# Patient Record
Sex: FEMALE | Race: WHITE | NOT HISPANIC OR LATINO | Employment: FULL TIME | URBAN - METROPOLITAN AREA
[De-identification: names, ages, dates, MRNs, and addresses within clinical notes are randomized per-mention and may not be internally consistent; named-entity substitution may affect disease eponyms.]

---

## 2017-04-27 ENCOUNTER — OFFICE VISIT (OUTPATIENT)
Dept: FAMILY MEDICINE CLINIC | Facility: CLINIC | Age: 57
End: 2017-04-27

## 2017-04-27 VITALS
DIASTOLIC BLOOD PRESSURE: 70 MMHG | HEART RATE: 74 BPM | TEMPERATURE: 98.2 F | BODY MASS INDEX: 32.17 KG/M2 | RESPIRATION RATE: 16 BRPM | WEIGHT: 174.8 LBS | OXYGEN SATURATION: 98 % | SYSTOLIC BLOOD PRESSURE: 104 MMHG | HEIGHT: 62 IN

## 2017-04-27 DIAGNOSIS — Z00.00 HEALTH CARE MAINTENANCE: Primary | ICD-10-CM

## 2017-04-27 DIAGNOSIS — R53.82 CHRONIC FATIGUE: ICD-10-CM

## 2017-04-27 DIAGNOSIS — J30.1 SEASONAL ALLERGIC RHINITIS DUE TO POLLEN: ICD-10-CM

## 2017-04-27 PROCEDURE — 99386 PREV VISIT NEW AGE 40-64: CPT | Performed by: INTERNAL MEDICINE

## 2017-04-27 RX ORDER — OLOPATADINE HYDROCHLORIDE 1 MG/ML
1 SOLUTION/ DROPS OPHTHALMIC 2 TIMES DAILY
Qty: 5 ML | Refills: 1 | Status: SHIPPED | OUTPATIENT
Start: 2017-04-27 | End: 2019-06-10

## 2017-04-27 RX ORDER — MONTELUKAST SODIUM 10 MG/1
TABLET ORAL
Refills: 3 | COMMUNITY
Start: 2017-04-03

## 2017-04-27 NOTE — PROGRESS NOTES
"Subjective   Patient ID: Luz Mcmahon is a 57 y.o. female presents with   Chief Complaint   Patient presents with   • Annual Exam     Former [facility]  patient   • Establish Care       HPI - This patient presents today to establish care.  She has a history of seasonal allergic rhinitis.  She is on Singulair and antihistamines and fluticasone.  But she also has allergic conjunctivitis and her eyes stay very itchy and red.  Overall she feels good and she is caught up on her mammogram and colonoscopy.    Assessment plan    Seasonal allergic rhinitis due to pollen and allergic conjunctivitis - patenol eyedrops    Mild occasional fatigue and health care maintenance CBC CMP lipids and thyroids caught up on screenings.    Allergies   Allergen Reactions   • Sulfa Antibiotics        The following portions of the patient's history were reviewed and updated as appropriate: allergies, current medications, past family history, past medical history, past social history, past surgical history and problem list.      Review of Systems   Constitutional: Positive for fatigue.   HENT: Negative.    Eyes: Positive for itching.   Respiratory: Negative.    Cardiovascular: Negative.    Gastrointestinal: Negative.    Endocrine: Negative.    Genitourinary: Negative.    Musculoskeletal: Negative.    Skin: Negative.    Allergic/Immunologic: Negative.    Neurological: Negative.    Hematological: Negative.    Psychiatric/Behavioral: Negative.        Objective     Vitals:    04/27/17 1101   BP: 104/70   Pulse: 74   Resp: 16   Temp: 98.2 °F (36.8 °C)   TempSrc: Oral   SpO2: 98%   Weight: 174 lb 12.8 oz (79.3 kg)   Height: 61.5\" (156.2 cm)         Physical Exam   Constitutional: She is oriented to person, place, and time. She appears well-developed and well-nourished. No distress.   HENT:   Head: Normocephalic and atraumatic.   Eyes: EOM are normal. Pupils are equal, round, and reactive to light. Right eye exhibits no discharge. Left eye " exhibits no discharge. No scleral icterus.   Conjunctival injection   Neck: Normal range of motion. Neck supple. No tracheal deviation present. No thyromegaly present.   Cardiovascular: Normal rate, regular rhythm, normal heart sounds and normal pulses.  Exam reveals no gallop.    No murmur heard.  Pulmonary/Chest: Effort normal and breath sounds normal. No respiratory distress. She has no wheezes. She has no rales.   Abdominal: Soft. Bowel sounds are normal. There is no tenderness.   Musculoskeletal: Normal range of motion.   Neurological: She is alert and oriented to person, place, and time.   Skin: Skin is warm and dry. No rash noted. No erythema. No pallor.   Psychiatric: She has a normal mood and affect. Her behavior is normal. Judgment and thought content normal.   Nursing note and vitals reviewed.        Luz was seen today for annual exam and establish care.    Diagnoses and all orders for this visit:    Health care maintenance  -     Lipid Panel  -     CBC & Differential  -     Comprehensive Metabolic Panel  -     TSH+Free T4    Seasonal allergic rhinitis due to pollen  -     Lipid Panel  -     CBC & Differential  -     Comprehensive Metabolic Panel  -     TSH+Free T4    Chronic fatigue  -     Lipid Panel  -     CBC & Differential  -     Comprehensive Metabolic Panel  -     TSH+Free T4    Other orders  -     olopatadine (PATANOL) 0.1 % ophthalmic solution; Administer 1 drop to both eyes 2 (Two) Times a Day.        Call or return to clinic prn if these symptoms worsen or fail to improve as anticipated.

## 2017-05-02 LAB
ALBUMIN SERPL-MCNC: 4.4 G/DL (ref 3.5–5.2)
ALBUMIN/GLOB SERPL: 1.6 G/DL
ALP SERPL-CCNC: 108 U/L (ref 39–117)
ALT SERPL-CCNC: 24 U/L (ref 1–33)
AST SERPL-CCNC: 20 U/L (ref 1–32)
BASOPHILS # BLD AUTO: 0.02 10*3/MM3 (ref 0–0.2)
BASOPHILS NFR BLD AUTO: 0.4 % (ref 0–1.5)
BILIRUB SERPL-MCNC: 0.4 MG/DL (ref 0.1–1.2)
BUN SERPL-MCNC: 18 MG/DL (ref 6–20)
BUN/CREAT SERPL: 25.4 (ref 7–25)
CALCIUM SERPL-MCNC: 9.7 MG/DL (ref 8.6–10.5)
CHLORIDE SERPL-SCNC: 102 MMOL/L (ref 98–107)
CHOLEST SERPL-MCNC: 259 MG/DL (ref 0–200)
CO2 SERPL-SCNC: 27 MMOL/L (ref 22–29)
CREAT SERPL-MCNC: 0.71 MG/DL (ref 0.57–1)
EOSINOPHIL # BLD AUTO: 0.1 10*3/MM3 (ref 0–0.7)
EOSINOPHIL NFR BLD AUTO: 1.9 % (ref 0.3–6.2)
ERYTHROCYTE [DISTWIDTH] IN BLOOD BY AUTOMATED COUNT: 13.5 % (ref 11.7–13)
GLOBULIN SER CALC-MCNC: 2.8 GM/DL
GLUCOSE SERPL-MCNC: 86 MG/DL (ref 65–99)
HCT VFR BLD AUTO: 41.2 % (ref 35.6–45.5)
HDLC SERPL-MCNC: 83 MG/DL (ref 40–60)
HGB BLD-MCNC: 13.5 G/DL (ref 11.9–15.5)
IMM GRANULOCYTES # BLD: 0 10*3/MM3 (ref 0–0.03)
IMM GRANULOCYTES NFR BLD: 0 % (ref 0–0.5)
LDLC SERPL CALC-MCNC: 155 MG/DL (ref 0–100)
LYMPHOCYTES # BLD AUTO: 1.84 10*3/MM3 (ref 0.9–4.8)
LYMPHOCYTES NFR BLD AUTO: 34.3 % (ref 19.6–45.3)
MCH RBC QN AUTO: 30.8 PG (ref 26.9–32)
MCHC RBC AUTO-ENTMCNC: 32.8 G/DL (ref 32.4–36.3)
MCV RBC AUTO: 94.1 FL (ref 80.5–98.2)
MONOCYTES # BLD AUTO: 0.52 10*3/MM3 (ref 0.2–1.2)
MONOCYTES NFR BLD AUTO: 9.7 % (ref 5–12)
NEUTROPHILS # BLD AUTO: 2.89 10*3/MM3 (ref 1.9–8.1)
NEUTROPHILS NFR BLD AUTO: 53.7 % (ref 42.7–76)
PLATELET # BLD AUTO: 257 10*3/MM3 (ref 140–500)
POTASSIUM SERPL-SCNC: 4.3 MMOL/L (ref 3.5–5.2)
PROT SERPL-MCNC: 7.2 G/DL (ref 6–8.5)
RBC # BLD AUTO: 4.38 10*6/MM3 (ref 3.9–5.2)
SODIUM SERPL-SCNC: 141 MMOL/L (ref 136–145)
T4 FREE SERPL-MCNC: 0.98 NG/DL (ref 0.93–1.7)
TRIGL SERPL-MCNC: 105 MG/DL (ref 0–150)
TSH SERPL DL<=0.005 MIU/L-ACNC: 3.52 MIU/ML (ref 0.27–4.2)
VLDLC SERPL CALC-MCNC: 21 MG/DL (ref 5–40)
WBC # BLD AUTO: 5.37 10*3/MM3 (ref 4.5–10.7)

## 2018-05-18 ENCOUNTER — APPOINTMENT (OUTPATIENT)
Dept: WOMENS IMAGING | Facility: HOSPITAL | Age: 58
End: 2018-05-18

## 2018-05-18 PROCEDURE — 77067 SCR MAMMO BI INCL CAD: CPT | Performed by: RADIOLOGY

## 2018-05-18 PROCEDURE — 77063 BREAST TOMOSYNTHESIS BI: CPT | Performed by: RADIOLOGY

## 2018-11-06 ENCOUNTER — OFFICE VISIT (OUTPATIENT)
Dept: FAMILY MEDICINE CLINIC | Facility: CLINIC | Age: 58
End: 2018-11-06

## 2018-11-06 ENCOUNTER — RESULTS ENCOUNTER (OUTPATIENT)
Dept: FAMILY MEDICINE CLINIC | Facility: CLINIC | Age: 58
End: 2018-11-06

## 2018-11-06 VITALS
SYSTOLIC BLOOD PRESSURE: 130 MMHG | HEIGHT: 62 IN | TEMPERATURE: 98.3 F | WEIGHT: 185.5 LBS | DIASTOLIC BLOOD PRESSURE: 68 MMHG | RESPIRATION RATE: 14 BRPM | OXYGEN SATURATION: 98 % | BODY MASS INDEX: 34.14 KG/M2 | HEART RATE: 74 BPM

## 2018-11-06 DIAGNOSIS — Z00.00 ENCOUNTER FOR HEALTH MAINTENANCE EXAMINATION: Primary | ICD-10-CM

## 2018-11-06 DIAGNOSIS — Z13.220 SCREENING FOR LIPID DISORDERS: ICD-10-CM

## 2018-11-06 DIAGNOSIS — Z12.11 SCREEN FOR COLON CANCER: ICD-10-CM

## 2018-11-06 DIAGNOSIS — Z13.1 SCREENING FOR DIABETES MELLITUS: ICD-10-CM

## 2018-11-06 LAB
ALBUMIN SERPL-MCNC: 4.4 G/DL (ref 3.5–5.2)
ALBUMIN/GLOB SERPL: 1.7 G/DL
ALP SERPL-CCNC: 100 U/L (ref 39–117)
ALT SERPL-CCNC: 19 U/L (ref 1–33)
AST SERPL-CCNC: 18 U/L (ref 1–32)
BILIRUB SERPL-MCNC: 0.2 MG/DL (ref 0.1–1.2)
BUN SERPL-MCNC: 18 MG/DL (ref 6–20)
BUN/CREAT SERPL: 18 (ref 7–25)
CALCIUM SERPL-MCNC: 9.7 MG/DL (ref 8.6–10.5)
CHLORIDE SERPL-SCNC: 103 MMOL/L (ref 98–107)
CHOLEST SERPL-MCNC: 226 MG/DL (ref 0–200)
CO2 SERPL-SCNC: 26.7 MMOL/L (ref 22–29)
CREAT SERPL-MCNC: 1 MG/DL (ref 0.57–1)
GLOBULIN SER CALC-MCNC: 2.6 GM/DL
GLUCOSE SERPL-MCNC: 92 MG/DL (ref 65–99)
HBA1C MFR BLD: 5.4 % (ref 4.8–5.6)
HDLC SERPL-MCNC: 89 MG/DL (ref 40–60)
LDLC SERPL CALC-MCNC: 113 MG/DL (ref 0–100)
POTASSIUM SERPL-SCNC: 4.3 MMOL/L (ref 3.5–5.2)
PROT SERPL-MCNC: 7 G/DL (ref 6–8.5)
SODIUM SERPL-SCNC: 143 MMOL/L (ref 136–145)
TRIGL SERPL-MCNC: 121 MG/DL (ref 0–150)
VLDLC SERPL CALC-MCNC: 24.2 MG/DL (ref 5–40)

## 2018-11-06 PROCEDURE — 99396 PREV VISIT EST AGE 40-64: CPT | Performed by: FAMILY MEDICINE

## 2018-11-06 NOTE — PROGRESS NOTES
Luz Mcmahon is a 58 y.o. female.     Chief Complaint   Patient presents with   • Allergies     Patient needs to establish a care.   • GI Problem     Patient needs to get cologaurd done.   • Annual Exam       HPI     Patient percent cells today for an annual exam.  This patient's first visit to our office.  Patient denies any significant past medical history.  Tries to maintain healthy diet and exercise regimen.  Family history significant for lung cancer.  Patient denies any tobacco or recreational drug use.  Has an occasional alcoholic beverage.    The following portions of the patient's history were reviewed and updated as appropriate: allergies, current medications, past family history, past medical history, past social history, past surgical history and problem list.    Review of Systems   Constitutional: Negative for activity change.   All other systems reviewed and are negative.      Objective  Vitals:    11/06/18 1246   BP: 130/68   Pulse: 74   Resp: 14   Temp: 98.3 °F (36.8 °C)   SpO2: 98%       Physical Exam   Constitutional: She is oriented to person, place, and time. She appears well-developed and well-nourished. No distress.   HENT:   Head: Normocephalic and atraumatic.   Right Ear: External ear normal.   Left Ear: External ear normal.   Nose: Nose normal.   Mouth/Throat: Oropharynx is clear and moist.   Eyes: Pupils are equal, round, and reactive to light. Conjunctivae and EOM are normal. Right eye exhibits no discharge. Left eye exhibits no discharge. No scleral icterus.   Neck: Normal range of motion. Neck supple.   Cardiovascular: Normal rate, regular rhythm and normal heart sounds.  Exam reveals no friction rub.    No murmur heard.  Pulmonary/Chest: Effort normal and breath sounds normal. No respiratory distress. She has no wheezes. She has no rales.   Abdominal: Soft. Bowel sounds are normal. She exhibits no distension. There is no tenderness. There is no rebound and no guarding.    Lymphadenopathy:     She has no cervical adenopathy.   Neurological: She is alert and oriented to person, place, and time.   Skin: Skin is warm and dry. She is not diaphoretic.   Nursing note and vitals reviewed.        Current Outpatient Prescriptions:   •  ASPIRIN 81 PO, Take  by mouth., Disp: , Rfl:   •  montelukast (SINGULAIR) 10 MG tablet, TK 1 T PO HS, Disp: , Rfl: 3  •  olopatadine (PATANOL) 0.1 % ophthalmic solution, Administer 1 drop to both eyes 2 (Two) Times a Day., Disp: 5 mL, Rfl: 1  Current outpatient and discharge medications have been reconciled for the patient.  Reviewed by: Ronaldo Carlisle MD      Procedures    Lab Results (most recent)     None                  Luz was seen today for allergies, gi problem and annual exam.    Diagnoses and all orders for this visit:    Encounter for health maintenance examination    Screen for colon cancer  -     Cologuard - Stool, Per Rectum; Future    Screening for lipid disorders  -     Lipid Panel    Screening for diabetes mellitus  -     Comprehensive Metabolic Panel  -     Hemoglobin A1c      Extensive conversation had with the patient regarding her past medical history.  Screening labs as above.  Discussed need to maintain healthy body weight through diet and exercise.  We'll can indicate results patient when available.    Return for As needed.      Ronaldo Carlisle MD

## 2019-05-13 ENCOUNTER — TELEPHONE (OUTPATIENT)
Dept: FAMILY MEDICINE CLINIC | Facility: CLINIC | Age: 59
End: 2019-05-13

## 2019-05-13 NOTE — TELEPHONE ENCOUNTER
Pt called stating she called 2 weeks ago for a colonoscopy order. I do not see anything regarding this. Pt states she did see Dr. Trivedi a few years back for a colonoscopy and she would like to still go to her. I informed her since she is established she should be able to contact them to get this set up.   If she has any problems, I advised her to call back for order and we can get it sent to them.

## 2019-05-23 ENCOUNTER — APPOINTMENT (OUTPATIENT)
Dept: WOMENS IMAGING | Facility: HOSPITAL | Age: 59
End: 2019-05-23

## 2019-05-23 PROCEDURE — 77063 BREAST TOMOSYNTHESIS BI: CPT | Performed by: RADIOLOGY

## 2019-05-23 PROCEDURE — 77067 SCR MAMMO BI INCL CAD: CPT | Performed by: RADIOLOGY

## 2019-06-10 ENCOUNTER — OFFICE VISIT (OUTPATIENT)
Dept: SURGERY | Facility: CLINIC | Age: 59
End: 2019-06-10

## 2019-06-10 VITALS — HEART RATE: 74 BPM | BODY MASS INDEX: 35.12 KG/M2 | OXYGEN SATURATION: 98 % | HEIGHT: 61 IN | WEIGHT: 186 LBS

## 2019-06-10 DIAGNOSIS — R19.5 POSITIVE COLORECTAL CANCER SCREENING USING DNA-BASED STOOL TEST: Primary | ICD-10-CM

## 2019-06-10 PROCEDURE — 99204 OFFICE O/P NEW MOD 45 MIN: CPT | Performed by: SURGERY

## 2019-06-10 RX ORDER — LACTOBACILLUS RHAMNOSUS GG 10B CELL
1 CAPSULE ORAL DAILY
COMMUNITY

## 2019-06-10 NOTE — PROGRESS NOTES
SURGERY  Luz CHAPIN Mcmahon   1960  06/10/19    Chief Complaint:  Rectal bleeding    HPI    Patient is a very nice 59 y.o. female with a history of hemorrhoids who had a c scope and hemorrhoidectomy in 2002.  For about the last 5 years she has had off and on rectal bleeding, not really worse with constipation, tho she does suffer from such.  She underwent a cologuard recently that was positive.  She went to [place] for 4 months or so and now presents for evaluation.  She states she was afraid that I would tell her she would need to have a hemorrhoidectomy again.    She does use a probiotic but no fiber supplement.  Two sisters have had polyps, but there has been no cancer.  She had no polyps in 2002.      Past Medical History:   Diagnosis Date   • Allergic      Past Surgical History:   Procedure Laterality Date   • COLONOSCOPY N/A 02/27/2002    Two prolapsing internal hemorrhoids (right anterior, left posterior)-Dr. Jazmín Trivedi   • HEMORROIDECTOMY N/A 02/27/2002    Dr. Jazmín Trivedi     Family History   Problem Relation Age of Onset   • Lung cancer Mother    • Lung cancer Father    • Polycystic ovary syndrome Sister    • Colon polyps Sister    • Hypertension Brother    • Colon polyps Sister      Social History     Socioeconomic History   • Marital status:      Spouse name: Not on file   • Number of children: Not on file   • Years of education: Not on file   • Highest education level: Not on file   Tobacco Use   • Smoking status: Never Smoker   • Smokeless tobacco: Never Used   Substance and Sexual Activity   • Alcohol use: Yes     Comment: social   • Drug use: No   • Sexual activity: Defer         Current Outpatient Medications:   •  ASPIRIN 81 PO, Take 81 mg by mouth Daily., Disp: , Rfl:   •  Cholecalciferol (VITAMIN D3 PO), Take 2 tablets by mouth Daily., Disp: , Rfl:   •  montelukast (SINGULAIR) 10 MG tablet, TK 1 T PO HS, Disp: , Rfl: 3  •  probiotic (CULTURELLE) capsule capsule, Take 1 capsule by  "mouth Daily., Disp: , Rfl:     Allergies   Allergen Reactions   • Sulfa Antibiotics Swelling     Lip swelling     Review of Systems   HENT: Positive for rhinorrhea.    All other systems reviewed and are negative.      Vitals:    06/10/19 1446   Pulse: 74   SpO2: 98%   Weight: 84.4 kg (186 lb)   Height: 154.9 cm (61\")       PHYSICAL EXAM:    Pulse 74   Ht 154.9 cm (61\")   Wt 84.4 kg (186 lb)   SpO2 98%   BMI 35.14 kg/m²   Body mass index is 35.14 kg/m².    Constitutional: well developed, well nourished, appears  healthy, stated age  Eyes: sclera nonicteric, conjunctiva not injected   ENMT: Hearing intact, trachea midline, thyroid without masses  CVS: RRR, no murmur, peripheral edema not present  Respiratory: CTA, normal respiratory effort   Gastrointestinal: no hepatosplenomegaly, abdomen soft, nontender, abdominal hernia not detected, anal exam without obvious external hemorrhoids, but with some external skin tags, stool somewhat firm and rounded  Genitourinary: inguinal hernia not detected  Musculoskeletal: gait normal, muscle mass normal  Skin: warm and dry, no rashes visible  Neurological: awake and alert, seems to have reasonable capacity for understanding for medical decision making  Psychiatric: appears to have reasonable judgement, pleasant  Lymphatics: no cervical adenopathy    Radiographic Findings: None    Lab Findings: Positive Cologuard    IMPRESSION:  · Rectal bleeding, chronic, likely related to hemorrhoidal disease, status post hemorrhoidectomy in the past.  Prior colonoscopy at the same time without any polyps  · Positive Cologuard  · Constipation     PLAN:  · Colonoscopy.  We need to do this simply to evaluate the positive Cologuard  · We will consider hemorrhoidal banding if she has internal hemorrhoids that seem amenable at the time of the procedure  · Hold her aspirin for 1 week  · We will need to do no heavy lifting for 1 week after surgery if we do the hemorrhoidal banding  · Recommended " fiber for her constipation to change her stool type.  · Recommended against an external hemorrhoidectomy.      Jazmín Trivedi MD  06/10/19  6:21 PM

## 2019-07-01 ENCOUNTER — ANESTHESIA EVENT (OUTPATIENT)
Dept: GASTROENTEROLOGY | Facility: HOSPITAL | Age: 59
End: 2019-07-01

## 2019-07-01 ENCOUNTER — ANESTHESIA (OUTPATIENT)
Dept: GASTROENTEROLOGY | Facility: HOSPITAL | Age: 59
End: 2019-07-01

## 2019-07-01 ENCOUNTER — HOSPITAL ENCOUNTER (OUTPATIENT)
Facility: HOSPITAL | Age: 59
Setting detail: HOSPITAL OUTPATIENT SURGERY
Discharge: HOME | End: 2019-07-01
Attending: SURGERY | Admitting: SURGERY

## 2019-07-01 VITALS
OXYGEN SATURATION: 100 % | BODY MASS INDEX: 34.78 KG/M2 | RESPIRATION RATE: 17 BRPM | TEMPERATURE: 98 F | WEIGHT: 184.06 LBS | DIASTOLIC BLOOD PRESSURE: 82 MMHG | SYSTOLIC BLOOD PRESSURE: 131 MMHG | HEART RATE: 76 BPM

## 2019-07-01 PROCEDURE — 25010000002 GLUCAGON (RDNA) PER 1 MG: Performed by: SURGERY

## 2019-07-01 PROCEDURE — 25010000002 PROPOFOL 10 MG/ML EMULSION: Performed by: ANESTHESIOLOGY

## 2019-07-01 PROCEDURE — 45398 COLONOSCOPY W/BAND LIGATION: CPT | Performed by: SURGERY

## 2019-07-01 RX ORDER — SODIUM CHLORIDE, SODIUM LACTATE, POTASSIUM CHLORIDE, CALCIUM CHLORIDE 600; 310; 30; 20 MG/100ML; MG/100ML; MG/100ML; MG/100ML
1000 INJECTION, SOLUTION INTRAVENOUS CONTINUOUS
Status: DISCONTINUED | OUTPATIENT
Start: 2019-07-01 | End: 2019-07-01 | Stop reason: HOSPADM

## 2019-07-01 RX ORDER — BUPIVACAINE HYDROCHLORIDE AND EPINEPHRINE 5; 5 MG/ML; UG/ML
INJECTION, SOLUTION PERINEURAL AS NEEDED
Status: DISCONTINUED | OUTPATIENT
Start: 2019-07-01 | End: 2019-07-01 | Stop reason: HOSPADM

## 2019-07-01 RX ORDER — HYDROCODONE BITARTRATE AND ACETAMINOPHEN 5; 325 MG/1; MG/1
1 TABLET ORAL EVERY 4 HOURS PRN
Qty: 16 TABLET | Refills: 0 | Status: SHIPPED | OUTPATIENT
Start: 2019-07-01 | End: 2020-05-20

## 2019-07-01 RX ORDER — LIDOCAINE HYDROCHLORIDE 20 MG/ML
INJECTION, SOLUTION INFILTRATION; PERINEURAL AS NEEDED
Status: DISCONTINUED | OUTPATIENT
Start: 2019-07-01 | End: 2019-07-01 | Stop reason: SURG

## 2019-07-01 RX ORDER — PROPOFOL 10 MG/ML
VIAL (ML) INTRAVENOUS AS NEEDED
Status: DISCONTINUED | OUTPATIENT
Start: 2019-07-01 | End: 2019-07-01 | Stop reason: SURG

## 2019-07-01 RX ORDER — PROPOFOL 10 MG/ML
VIAL (ML) INTRAVENOUS CONTINUOUS PRN
Status: DISCONTINUED | OUTPATIENT
Start: 2019-07-01 | End: 2019-07-01 | Stop reason: SURG

## 2019-07-01 RX ADMIN — PROPOFOL 100 MG: 10 INJECTION, EMULSION INTRAVENOUS at 09:38

## 2019-07-01 RX ADMIN — SODIUM CHLORIDE, POTASSIUM CHLORIDE, SODIUM LACTATE AND CALCIUM CHLORIDE 1000 ML: 600; 310; 30; 20 INJECTION, SOLUTION INTRAVENOUS at 08:36

## 2019-07-01 RX ADMIN — PROPOFOL 100 MCG/KG/MIN: 10 INJECTION, EMULSION INTRAVENOUS at 09:35

## 2019-07-01 RX ADMIN — LIDOCAINE HYDROCHLORIDE 60 MG: 20 INJECTION, SOLUTION INFILTRATION; PERINEURAL at 09:38

## 2019-07-01 NOTE — DISCHARGE INSTRUCTIONS
"1.  The BEST method of relief for anal pain is a sitz bath (warm tub bath to the anus).  This relaxes the anal sphincter and reduces pain, while at the same time, aiding with hygiene.  Post-op pain is usually minimal but is more likely if the internal hemorrhoid has an external component that was banded.  If you do have external pain, this will be most notable for the first 1-3 days.  The prep that you took before the banding will assist in making your pain less severe for the first several days, while not having bowel movements.    2.  It is very common to have a sense of fullness, or \"something\" in your rectum.  This is because the hemorrhoids swell after being banded.  Later they will necrose, slough off, and the band will fall off.  It may be seen as a blue rubber band in the commode.  Do NOT strain during the first several days or you may dislodge the band reducing the effectiveness of the procedure.    3.  Usually you will not have a substantial amount of exudates from the anus as a result of this process.  If so, a water bottle with a pull out spout will work well as a rinsing bottle to keep next to the commode to help with hygiene after each bowel movement.  Otherwise, a trip to the shower with a hand held or detachable shower nozzle may be necessary.  Pat dry with the toilet tissue, anal pads, or towel, rather that rubbing dry.    4.  Bleeding with bowel movements is common for at least 2 weeks.    5.  Usually, you will not be given a prescription for narcotics after the banding.  Unless you have kidney problems, or are intolerant of non-steroidals, they may be used for pian control.  These are medicines such as ibuprofen, advil, motrin, and aleve.  Take as prescribed.    6.  Drink lots of water and take a stool softener daily, such as colace 100mg, to help avoid constipation.  A fiber supplement may be added.  If you feel you are getting constipated, add twice daily dosing of milk of magnesia until the " first loose bowel movement.    7.  No strenuous activity for 2 weeks.    8.  In general, if you have a sedentary job, you can return to work in 1-2 days.  If heavy lifting is required, perhaps 1 week.    9.  If you are given a prescription for narcotics, do not take them on an empty stomach.  It is not unusual for the narcotics to induce constipation, so be mindful of the number that are used.For the next 24 hours patient needs to be with a responsible adult.    For 24 hours DO NOT drive, operate machinery, appliances, drink alcohol, make important decisions or sign legal documents.    Start with a light or bland diet if you are feeling sick to your stomach otherwise advance to regular diet as tolerated.    Follow recommendations on procedure report if provided by your doctor.    Call Dr Trivedi for problems [phone]    Problems may include but not limited to: large amounts of bleeding, trouble breathing, repeated vomiting, severe unrelieved pain, fever or chills.

## 2019-07-01 NOTE — ANESTHESIA POSTPROCEDURE EVALUATION
Patient: Luz Mcmahon    Procedure Summary     Date:  07/01/19 Room / Location:   [facility]    Anesthesia Start:  0936 Anesthesia Stop:  1008    Procedure:  COLONOSCOPY to CECUM AND TI WITH HEMORRHOID BANDING X 1 (N/A ) Diagnosis:       Positive colorectal cancer screening using DNA-based stool test      (Positive colorectal cancer screening using DNA-based stool test [R19.5])    Surgeon:  Jazmín Trivedi MD Provider:  Billy Erazo MD    Anesthesia Type:  MAC ASA Status:  1          Anesthesia Type: MAC  Last vitals  BP   131/82 (07/01/19 1023)   Temp   36.7 °C (98 °F) (07/01/19 0823)   Pulse   76 (07/01/19 1023)   Resp   17 (07/01/19 1023)     SpO2   100 % (07/01/19 1023)     Post Anesthesia Care and Evaluation    Patient location during evaluation: PACU  Patient participation: complete - patient participated  Level of consciousness: awake  Pain score: 0  Pain management: adequate  Airway patency: patent  Anesthetic complications: No anesthetic complications  PONV Status: none  Cardiovascular status: acceptable  Respiratory status: acceptable  Hydration status: acceptable    Comments: /82   Pulse 76   Temp 36.7 °C (98 °F) (Oral)   Resp 17   Wt 83.5 kg (184 lb 1 oz)   SpO2 100%   BMI 34.78 kg/m²

## 2019-07-01 NOTE — ANESTHESIA PREPROCEDURE EVALUATION
Anesthesia Evaluation     Patient summary reviewed and Nursing notes reviewed                Airway   Mallampati: I  TM distance: >3 FB  Neck ROM: full  No difficulty expected  Dental - normal exam     Pulmonary - negative pulmonary ROS and normal exam   Cardiovascular - negative cardio ROS and normal exam        Neuro/Psych- negative ROS  GI/Hepatic/Renal/Endo - negative ROS     Musculoskeletal (-) negative ROS    Abdominal  - normal exam    Bowel sounds: normal.   Substance History - negative use     OB/GYN negative ob/gyn ROS         Other                        Anesthesia Plan    ASA 1     MAC     Anesthetic plan, all risks, benefits, and alternatives have been provided, discussed and informed consent has been obtained with: patient.

## 2019-07-01 NOTE — INTERVAL H&P NOTE
H&P updated. The patient was examined and the following changes are noted:  rectal bleeding flared significanly yesterday. she desires we proceed if there are hemorrhoids amenable to banding.

## 2019-07-01 NOTE — OP NOTE
Colonoscopy Procedure Note  Luz Mcmahon  1960  Date of Procedure: 07/01/19    Pre-operative Diagnosis:    · Rectal bleeding, chronic, likely related to hemorrhoidal disease, status post hemorrhoidectomy in the past.  Prior colonoscopy at the same time without any polyps  · Positive Cologuard  · Constipation    Post-operative Diagnosis:  · Normal c scope other than internal hemorrhoid    Procedure: Colonoscopy, terminal ileoscopy, banding of internal hemorrrhoid     Findings/Treatments:   · Large internal hemorrhoid, banded.       Recommendations:   · Colonoscopy based on screening recommendations.  · Review post op hemorrhoidal banding info.  · Keep a copy of the photographs of the procedure given to you today for possible need for reference in the future.    Surgeon: Farooq    Anesthetic: MAC per Billy Erazo MD    Indications or Associated Issues:  none    Scope Withdrawal Time:  10 minutes  53 seconds    Procedure Details     MAC anesthesia was induced.  The 180 Colonoscopy was inserted blindly into the rectum and advanced to the cecum, with relative ease, but with looping, without need for pressure, lift, or turning.    Cecum was identified by the appendiceal orifice and the ileocecal valve and photographed for documentation.  Terminal ileum was intubated and was normal.    Prep quality was excellent.  A careful inspection was made as the scope was withdrawn, including a retroflexed view of the rectum; there was no suggestion of presence of angiodysplasias, colitis, polyps or diverticula, with no interventions.     Retroflexion in the rectum revealed the internal hemorrhoid.  The EGD scope was hooked up with the , 30 cc of half percent marcaine with epi injected perianally.  The scope was then entered and retroflexed, the single hemorrhoid suctioned into the apparatus and banded.    Jazmín Trivedi MD    07/01/19  10:03 AM

## 2020-05-20 ENCOUNTER — OFFICE VISIT (OUTPATIENT)
Dept: FAMILY MEDICINE CLINIC | Facility: CLINIC | Age: 60
End: 2020-05-20

## 2020-05-20 VITALS
HEART RATE: 75 BPM | HEIGHT: 61 IN | BODY MASS INDEX: 34.36 KG/M2 | SYSTOLIC BLOOD PRESSURE: 122 MMHG | OXYGEN SATURATION: 97 % | TEMPERATURE: 97.6 F | DIASTOLIC BLOOD PRESSURE: 68 MMHG | WEIGHT: 182 LBS | RESPIRATION RATE: 16 BRPM

## 2020-05-20 DIAGNOSIS — Z00.00 ENCOUNTER FOR HEALTH MAINTENANCE EXAMINATION: Primary | ICD-10-CM

## 2020-05-20 DIAGNOSIS — Z13.29 SCREENING FOR THYROID DISORDER: ICD-10-CM

## 2020-05-20 DIAGNOSIS — Z13.220 SCREENING FOR LIPID DISORDERS: ICD-10-CM

## 2020-05-20 DIAGNOSIS — Z13.1 SCREENING FOR DIABETES MELLITUS: ICD-10-CM

## 2020-05-20 PROCEDURE — 99396 PREV VISIT EST AGE 40-64: CPT | Performed by: FAMILY MEDICINE

## 2020-05-20 NOTE — PROGRESS NOTES
Luz Mcmahon is a 60 y.o. female.     Chief Complaint   Patient presents with   • Annual Exam     patient needs to have physical       HPI     Patient presents the office today for health maintenance exam.  Denies any significant changes in personal medical history or family history.  In need of blood work today.  Had colonoscopy done within the last to 3 years.  Doing well.  Nothing was found.  Cologuard was previously positive that was the reason for the colonoscopy.  No tobacco use.  Splits time between [place] in [place].  Has enjoyed doing quarantine down in [place] on her beach.  Has been trying to keep physically active.    The following portions of the patient's history were reviewed and updated as appropriate: allergies, current medications, past family history, past medical history, past social history, past surgical history and problem list.    Review of Systems   Constitutional: Negative for activity change and appetite change.   Eyes: Negative for discharge and itching.   Musculoskeletal: Positive for arthralgias.   All other systems reviewed and are negative.    I have reviewed the ROS as documented by the MA. Ronaldo Carlisle MD    Objective  Vitals:    05/20/20 0838   BP: 122/68   Pulse: 75   Resp: 16   Temp: 97.6 °F (36.4 °C)   SpO2: 97%     Body mass index is 34.39 kg/m².    Physical Exam   Constitutional: She is oriented to person, place, and time. She appears well-developed and well-nourished. No distress.   HENT:   Head: Normocephalic and atraumatic.   Right Ear: External ear normal.   Left Ear: External ear normal.   Nose: Nose normal.   Mouth/Throat: Oropharynx is clear and moist.   Eyes: Pupils are equal, round, and reactive to light. Conjunctivae and EOM are normal. Right eye exhibits no discharge. Left eye exhibits no discharge. No scleral icterus.   Cardiovascular: Normal rate, regular rhythm and normal heart sounds.   Pulmonary/Chest: Effort normal and breath sounds normal. No  respiratory distress. She has no wheezes. She has no rales.   Abdominal: Soft. Bowel sounds are normal. She exhibits no distension. There is no tenderness.   Neurological: She is alert and oriented to person, place, and time.   Skin: Skin is warm and dry. She is not diaphoretic.   Nursing note and vitals reviewed.        Current Outpatient Medications:   •  ASPIRIN 81 PO, Take 81 mg by mouth Daily., Disp: , Rfl:   •  Cholecalciferol (VITAMIN D3 PO), Take 2 tablets by mouth Daily., Disp: , Rfl:   •  montelukast (SINGULAIR) 10 MG tablet, TK 1 T PO HS, Disp: , Rfl: 3  •  probiotic (CULTURELLE) capsule capsule, Take 1 capsule by mouth Daily., Disp: , Rfl:   Current outpatient and discharge medications have been reconciled for the patient.  Reviewed by: Ronaldo Carlisle MD      Procedures    Lab Results (most recent)     Sussy Escobar was seen today for annual exam.    Diagnoses and all orders for this visit:    Encounter for health maintenance examination    Screening for diabetes mellitus  -     Comprehensive Metabolic Panel  -     Hemoglobin A1c    Screening for lipid disorders  -     Lipid Panel    Screening for thyroid disorder  -     Thyroid Panel With TSH    Preventative health maintenance and screening as above.  Will get labs as above.  Will communicate results to patient when available.  Advised continued use of diet and exercise.      Return for As needed.      Ronaldo Carlisle MD

## 2020-05-22 LAB
ALBUMIN SERPL-MCNC: 4.9 G/DL (ref 3.8–4.8)
ALBUMIN/GLOB SERPL: 1.9 {RATIO} (ref 1.2–2.2)
ALP SERPL-CCNC: 116 IU/L (ref 39–117)
ALT SERPL-CCNC: 34 IU/L (ref 0–32)
AST SERPL-CCNC: 35 IU/L (ref 0–40)
BILIRUB SERPL-MCNC: 0.4 MG/DL (ref 0–1.2)
BUN SERPL-MCNC: 16 MG/DL (ref 8–27)
BUN/CREAT SERPL: 20 (ref 12–28)
CALCIUM SERPL-MCNC: 9.9 MG/DL (ref 8.7–10.3)
CHLORIDE SERPL-SCNC: 102 MMOL/L (ref 96–106)
CHOLEST SERPL-MCNC: 271 MG/DL (ref 100–199)
CO2 SERPL-SCNC: 23 MMOL/L (ref 20–29)
CREAT SERPL-MCNC: 0.79 MG/DL (ref 0.57–1)
FT4I SERPL CALC-MCNC: 1.5 (ref 1.2–4.9)
GLOBULIN SER CALC-MCNC: 2.6 G/DL (ref 1.5–4.5)
GLUCOSE SERPL-MCNC: 92 MG/DL (ref 65–99)
HBA1C MFR BLD: 5.4 % (ref 4.8–5.6)
HDLC SERPL-MCNC: 83 MG/DL
LDLC SERPL CALC-MCNC: 168 MG/DL (ref 0–99)
POTASSIUM SERPL-SCNC: 5 MMOL/L (ref 3.5–5.2)
PROT SERPL-MCNC: 7.5 G/DL (ref 6–8.5)
SODIUM SERPL-SCNC: 141 MMOL/L (ref 134–144)
T3RU NFR SERPL: 21 % (ref 24–39)
T4 SERPL-MCNC: 7.2 UG/DL (ref 4.5–12)
TRIGL SERPL-MCNC: 102 MG/DL (ref 0–149)
TSH SERPL DL<=0.005 MIU/L-ACNC: 3.42 UIU/ML (ref 0.45–4.5)
VLDLC SERPL CALC-MCNC: 20 MG/DL (ref 5–40)

## 2020-05-28 ENCOUNTER — APPOINTMENT (OUTPATIENT)
Dept: WOMENS IMAGING | Facility: HOSPITAL | Age: 60
End: 2020-05-28

## 2020-05-28 PROCEDURE — 77067 SCR MAMMO BI INCL CAD: CPT | Performed by: RADIOLOGY

## 2020-06-09 ENCOUNTER — TELEPHONE (OUTPATIENT)
Dept: FAMILY MEDICINE CLINIC | Facility: CLINIC | Age: 60
End: 2020-06-09

## 2020-06-09 NOTE — TELEPHONE ENCOUNTER
Patient has called multiple times regarding her lab work results. Can you please advise patient of her lab results.

## 2021-07-12 ENCOUNTER — APPOINTMENT (OUTPATIENT)
Dept: WOMENS IMAGING | Facility: HOSPITAL | Age: 61
End: 2021-07-12

## 2021-07-12 PROCEDURE — 77067 SCR MAMMO BI INCL CAD: CPT | Performed by: RADIOLOGY

## 2021-07-12 PROCEDURE — 77063 BREAST TOMOSYNTHESIS BI: CPT | Performed by: RADIOLOGY

## 2022-04-27 ENCOUNTER — APPOINTMENT (RX ONLY)
Dept: URBAN - METROPOLITAN AREA CLINIC 126 | Facility: CLINIC | Age: 62
Setting detail: DERMATOLOGY
End: 2022-04-27

## 2022-04-27 DIAGNOSIS — Z71.89 OTHER SPECIFIED COUNSELING: ICD-10-CM

## 2022-04-27 DIAGNOSIS — D49.2 NEOPLASM OF UNSPECIFIED BEHAVIOR OF BONE, SOFT TISSUE, AND SKIN: ICD-10-CM

## 2022-04-27 DIAGNOSIS — L81.4 OTHER MELANIN HYPERPIGMENTATION: ICD-10-CM

## 2022-04-27 DIAGNOSIS — D18.0 HEMANGIOMA: ICD-10-CM

## 2022-04-27 PROBLEM — D18.01 HEMANGIOMA OF SKIN AND SUBCUTANEOUS TISSUE: Status: ACTIVE | Noted: 2022-04-27

## 2022-04-27 PROCEDURE — ? COUNSELING

## 2022-04-27 PROCEDURE — ? SUNSCREEN RECOMMENDATIONS

## 2022-04-27 PROCEDURE — ? BIOPSY BY SHAVE METHOD

## 2022-04-27 PROCEDURE — 11102 TANGNTL BX SKIN SINGLE LES: CPT

## 2022-04-27 PROCEDURE — 99203 OFFICE O/P NEW LOW 30 MIN: CPT | Mod: 25

## 2022-04-27 ASSESSMENT — LOCATION DETAILED DESCRIPTION DERM
LOCATION DETAILED: EPIGASTRIC SKIN
LOCATION DETAILED: LEFT DISTAL POSTERIOR THIGH
LOCATION DETAILED: RIGHT ANTERIOR PROXIMAL THIGH
LOCATION DETAILED: RIGHT ANTERIOR SHOULDER
LOCATION DETAILED: LEFT LATERAL ABDOMEN
LOCATION DETAILED: LEFT ANTERIOR DISTAL THIGH
LOCATION DETAILED: RIGHT MID-UPPER BACK
LOCATION DETAILED: STERNAL NOTCH
LOCATION DETAILED: RIGHT SUPERIOR LATERAL LOWER BACK
LOCATION DETAILED: LEFT SUPERIOR UPPER BACK
LOCATION DETAILED: RIGHT SUPERIOR MEDIAL UPPER BACK
LOCATION DETAILED: RIGHT LATERAL ABDOMEN
LOCATION DETAILED: LEFT SUPERIOR MEDIAL LOWER BACK
LOCATION DETAILED: LEFT INFERIOR UPPER BACK
LOCATION DETAILED: LEFT ANTERIOR PROXIMAL THIGH

## 2022-04-27 ASSESSMENT — LOCATION SIMPLE DESCRIPTION DERM
LOCATION SIMPLE: LEFT POSTERIOR THIGH
LOCATION SIMPLE: LEFT THIGH
LOCATION SIMPLE: RIGHT LOWER BACK
LOCATION SIMPLE: LEFT UPPER BACK
LOCATION SIMPLE: LEFT LOWER BACK
LOCATION SIMPLE: RIGHT SHOULDER
LOCATION SIMPLE: RIGHT UPPER BACK
LOCATION SIMPLE: ABDOMEN
LOCATION SIMPLE: CHEST
LOCATION SIMPLE: RIGHT THIGH

## 2022-04-27 ASSESSMENT — LOCATION ZONE DERM
LOCATION ZONE: LEG
LOCATION ZONE: ARM
LOCATION ZONE: TRUNK

## 2022-06-21 ENCOUNTER — APPOINTMENT (OUTPATIENT)
Dept: WOMENS IMAGING | Facility: HOSPITAL | Age: 62
End: 2022-06-21

## 2022-06-21 PROCEDURE — 77067 SCR MAMMO BI INCL CAD: CPT | Performed by: RADIOLOGY

## 2022-06-21 PROCEDURE — 77063 BREAST TOMOSYNTHESIS BI: CPT | Performed by: RADIOLOGY

## 2023-05-16 ENCOUNTER — APPOINTMENT (RX ONLY)
Dept: URBAN - METROPOLITAN AREA CLINIC 126 | Facility: CLINIC | Age: 63
Setting detail: DERMATOLOGY
End: 2023-05-16

## 2023-05-16 DIAGNOSIS — L81.4 OTHER MELANIN HYPERPIGMENTATION: ICD-10-CM

## 2023-05-16 DIAGNOSIS — L82.1 OTHER SEBORRHEIC KERATOSIS: ICD-10-CM

## 2023-05-16 DIAGNOSIS — D18.0 HEMANGIOMA: ICD-10-CM

## 2023-05-16 DIAGNOSIS — Z71.89 OTHER SPECIFIED COUNSELING: ICD-10-CM

## 2023-05-16 PROBLEM — D18.01 HEMANGIOMA OF SKIN AND SUBCUTANEOUS TISSUE: Status: ACTIVE | Noted: 2023-05-16

## 2023-05-16 PROCEDURE — 99213 OFFICE O/P EST LOW 20 MIN: CPT

## 2023-05-16 PROCEDURE — ? COUNSELING

## 2023-05-16 PROCEDURE — ? SUNSCREEN RECOMMENDATIONS

## 2023-05-16 ASSESSMENT — LOCATION SIMPLE DESCRIPTION DERM
LOCATION SIMPLE: LEFT UPPER BACK
LOCATION SIMPLE: RIGHT UPPER BACK
LOCATION SIMPLE: RIGHT BREAST
LOCATION SIMPLE: LEFT FOREARM
LOCATION SIMPLE: CHEST
LOCATION SIMPLE: RIGHT FOREARM
LOCATION SIMPLE: LEFT BREAST
LOCATION SIMPLE: UPPER BACK
LOCATION SIMPLE: ABDOMEN

## 2023-05-16 ASSESSMENT — LOCATION DETAILED DESCRIPTION DERM
LOCATION DETAILED: INFERIOR THORACIC SPINE
LOCATION DETAILED: LEFT MEDIAL BREAST 6-7:00 REGION
LOCATION DETAILED: LEFT MEDIAL UPPER BACK
LOCATION DETAILED: RIGHT MEDIAL BREAST 5-6:00 REGION
LOCATION DETAILED: LEFT PROXIMAL DORSAL FOREARM
LOCATION DETAILED: LEFT SUPERIOR UPPER BACK
LOCATION DETAILED: RIGHT MEDIAL SUPERIOR CHEST
LOCATION DETAILED: SUBXIPHOID
LOCATION DETAILED: LOWER STERNUM
LOCATION DETAILED: RIGHT DISTAL DORSAL FOREARM
LOCATION DETAILED: RIGHT INFERIOR MEDIAL UPPER BACK

## 2023-05-16 ASSESSMENT — LOCATION ZONE DERM
LOCATION ZONE: TRUNK
LOCATION ZONE: ARM

## (undated) DEVICE — Device: Brand: DEFENDO AIR/WATER/SUCTION AND BIOPSY VALVE

## (undated) DEVICE — LIGATOR MULTIBAND SUPERVIEW 7 BX4

## (undated) DEVICE — CANN NASL CO2 TRULINK W/O2 A/

## (undated) DEVICE — TUBING, SUCTION, 1/4" X 10', STRAIGHT: Brand: MEDLINE

## (undated) DEVICE — THE TORRENT IRRIGATION SCOPE CONNECTOR IS USED WITH THE TORRENT IRRIGATION TUBING TO PROVIDE IRRIGATION FLUIDS SUCH AS STERILE WATER DURING GASTROINTESTINAL ENDOSCOPIC PROCEDURES WHEN USED IN CONJUNCTION WITH AN IRRIGATION PUMP (OR ELECTROSURGICAL UNIT).: Brand: TORRENT